# Patient Record
Sex: FEMALE | Employment: OTHER | URBAN - METROPOLITAN AREA
[De-identification: names, ages, dates, MRNs, and addresses within clinical notes are randomized per-mention and may not be internally consistent; named-entity substitution may affect disease eponyms.]

---

## 2021-07-26 ENCOUNTER — EVALUATION (OUTPATIENT)
Dept: PHYSICAL THERAPY | Facility: CLINIC | Age: 66
End: 2021-07-26
Payer: MEDICARE

## 2021-07-26 DIAGNOSIS — M25.552 LATERAL PAIN OF LEFT HIP: Primary | ICD-10-CM

## 2021-07-26 PROCEDURE — 97110 THERAPEUTIC EXERCISES: CPT | Performed by: PHYSICAL THERAPIST

## 2021-07-26 PROCEDURE — 97161 PT EVAL LOW COMPLEX 20 MIN: CPT | Performed by: PHYSICAL THERAPIST

## 2021-07-26 NOTE — LETTER
2021    Diony Morrow MD  C/Tucker Sanders    Patient: China Galvez   YOB: 1955   Date of Visit: 2021     Encounter Diagnosis     ICD-10-CM    1  Lateral pain of left hip  M25 552        Dear Dr Myesha Murrieta:    Thank you for your recent referral of China Galvez  Please review the attached evaluation summary from Iza's recent visit  Please verify that you agree with the plan of care by signing the attached order  If you have any questions or concerns, please do not hesitate to call  I sincerely appreciate the opportunity to share in the care of one of your patients and hope to have another opportunity to work with you in the near future  Sincerely,    Darline Arriaga, PT      Referring Provider:      I certify that I have read the below Plan of Care and certify the need for these services furnished under this plan of treatment while under my care  Diony Morrow MD  [address] [place] [address]  [address]  Via Fax: [phone]          PT Evaluation     Today's date: 2021  Patient name: China Galvez  : 1955  MRN: [id number]  Referring provider: Tate Boyle MD  Dx:   Encounter Diagnosis     ICD-10-CM    1  Lateral pain of left hip  M25 552                   Assessment/Plan  Ms Buffy Luther is a 77 y o  female presenting to outpatient physical therapy with L lateral hip pain, decreased hip ROM, and decreased L hip strength consistent with gluteal tendinopathy  Patient has c/o of L lateral hip pain with after walking and sitting  Provided education to avoid crossing legs and put a pillow between knees while sleeping  Functional limitations are result of the above impairments, which limit her ability to participate in ADLs and recreational activities    No further referral appears necessary at this time based upon examination results    The patients's greatest concerns are the pain not getting better and getting back to recreational activity or sport  Patient was given the opportunity to ask questions, and all questions were answered to the patient's satisfaction  Patient appears motivated, agrees with the POC, and is a good candidate for skilled physical therapy at this time  Patient was provided with handout of HEP consisting of S/L ABD, clamshells, piriformis stretching, and SKTC    Symptom irritability: Low Understanding of Dx/Px/POC: good  Prognosis: good    Goals  STG (4 weeks)  Pain: Patient will subjectively report maximum pain decreased by 2/10  Strength: Patient's will demonstrate L hip ABD strength improved by 1/2 grade  ROM: Patient will increase L hip  ROM by by 25%  Function: Patient increase score on FOTO (initial score: 54) by 10 points    LTG (8 weeks)  Pain: Patient will subjectively report less than 2/10 pain with functional activities  Strength: Patient's will demonstrate L hip ABD strength improved to WNL  ROM: Patient will improve global hip ROM by at least 50%   Function: Patient will increase score on FOTO to predicted value (69) or better  Patient will be ind with HEP by [address] [place] [address] Jim[address] details: Prognosis above is given PT services 2x/week tapering to 1x/week over the next 2 months and home program adherence    Patient would benefit from: skilled physical therapy  Referral necessary: no  Planned modality interventions: Hydrocollator packs, Cryotherapy and TENS  Planned therapy interventions: joint mobilization, manual therapy, massage, neuromuscular re-education, patient education, stretching, strengthening, therapeutic activities, therapeutic exercise, home exercise program, functional ROM exercises, gait training, flexibility, balance, abdominal trunk stabilization, motor coordination training, coordination and behavior modification  Frequency: 2x/week for 8 weeks  Duration in visits: 16  Plan of Care beginning date: 7/26/2021   Plan of Care expiration date: 9/20/2021  Treatment plan discussed with: patient    Subjective  Patient is a 77 y o  female who presents for an initial outpatient physical therapy consultation regarding their L hip pain  Patient reports that their pain began about an year ago  Patient states that she has had two CSI over the last year with minimal relief  Prior to [place], she was in the gym 5 days a week  Has been less active than normal over the last 1 5 year, due to [place] and recent fracture of her R wrist  She states that she has most of her pain after walking  Sitting makes it worse  Has been doing stretches over the last few weeks with some relief  Back surgery 30 years ago  Feels like L leg is slower  Recurrent Problem? yes    Pain  Best: 0/10  Worst: 5/10  Location: Lateral thigh  Quality:  Dull and Sharp  Aggravating factors: After walking, laying on that side, rising from chair, climbing stairs  Alleviating factors: rest, heat  Progression: Improving    Social Support  Lives with:   Home setup: multifloor  Steps into house: 2 steps      Employment status: retired  Hobbies/Recreational Activities: walking, gym     Diagnostic Tests  Xray: Mild arthritis    Treatments:  Previous treatments: medication, stretching  Current treatments: physical therapy      Patient Goals for Therapy  "Loosen up"     Objective     Observations   Left Hip  Negative for atrophy, deformity and edema  Palpation   Left   Tenderness of the gluteus medius  Tenderness     Left Hip   Tenderness in the greater trochanter  Lumbar Screen  Lumbar range of motion within normal limits      Neurological Testing     Sensation     Hip   Left Hip   Intact: light touch    Right Hip   Intact: light touch    Additional Neurological Details  Patient denies neuro symptoms    Passive Range of Motion   Left Hip   Flexion: 100 degrees   Abduction: 20 degrees   External rotation (90/90): 45 degrees with pain  Internal rotation (90/90): 20 degrees     Joint Play   Left Hip   Hypomobile in the posterior hip capsule, anterior hip capsule and lateral hip capsule  Strength/Myotome Testing     Left Hip   Planes of Motion   Flexion: 4  Abduction: 3+  External rotation: 4+  Internal rotation: 4+    Isolated Muscles   Gluteus medius: 3+    Right Hip   Normal muscle strength  Planes of Motion   Flexion: 4+  Abduction: 4  External rotation: 5  Internal rotation: 5    Tests     Left Hip   Positive FADIR  Negative WOODROW and Christine  Modified Davion: Positive         Flowsheet Rows      Most Recent Value   PT/OT G-Codes   Current Score  54   Projected Score  69             Precautions: n/a      Manuals 7/26            L hip PROM             Gluteal STM                                       Neuro Re-Ed                                                                                                        Ther Ex             bike             Piriformis str             SKTC             S/L ABD             Clamshells             bridge             Leg press             MH             Ther Activity                                       Gait Training                                       Modalities                          IE/HEP

## 2021-07-26 NOTE — PROGRESS NOTES
PT Evaluation     Today's date: 2021  Patient name: Dimitri Bowling  : 1955  MRN: [id number]  Referring provider: Paula Gallegos MD  Dx:   Encounter Diagnosis     ICD-10-CM    1  Lateral pain of left hip  M25 552                   Assessment/Plan  Ms Jeanna Lora is a 77 y o  female presenting to outpatient physical therapy with L lateral hip pain, decreased hip ROM, and decreased L hip strength consistent with gluteal tendinopathy  Patient has c/o of L lateral hip pain with after walking and sitting  Provided education to avoid crossing legs and put a pillow between knees while sleeping  Functional limitations are result of the above impairments, which limit her ability to participate in ADLs and recreational activities  No further referral appears necessary at this time based upon examination results    The patients's greatest concerns are the pain not getting better and getting back to recreational activity or sport  Patient was given the opportunity to ask questions, and all questions were answered to the patient's satisfaction  Patient appears motivated, agrees with the POC, and is a good candidate for skilled physical therapy at this time  Patient was provided with handout of HEP consisting of S/L ABD, clamshells, piriformis stretching, and SKTC    Symptom irritability: Low Understanding of Dx/Px/POC: good  Prognosis: good    Goals  STG (4 weeks)  Pain: Patient will subjectively report maximum pain decreased by 2/10  Strength: Patient's will demonstrate L hip ABD strength improved by 1/2 grade  ROM: Patient will increase L hip  ROM by by 25%  Function: Patient increase score on FOTO (initial score: 54) by 10 points    LTG (8 weeks)  Pain: Patient will subjectively report less than 2/10 pain with functional activities    Strength: Patient's will demonstrate L hip ABD strength improved to WNL  ROM: Patient will improve global hip ROM by at least 50%   Function: Patient will increase score on FOTO to predicted value (69) or better  Patient will be ind with HEP by [address] [facility] [address] details: Prognosis above is given PT services 2x/week tapering to 1x/week over the next 2 months and home program adherence  Patient would benefit from: skilled physical therapy  Referral necessary: no  Planned modality interventions: Hydrocollator packs, Cryotherapy and TENS  Planned therapy interventions: joint mobilization, manual therapy, massage, neuromuscular re-education, patient education, stretching, strengthening, therapeutic activities, therapeutic exercise, home exercise program, functional ROM exercises, gait training, flexibility, balance, abdominal trunk stabilization, motor coordination training, coordination and behavior modification  Frequency: 2x/week for 8 weeks  Duration in visits: [address] beginning date: 7/26/2021   Plan of Care expiration date: 9/20/2021  Treatment plan discussed with: patient    Subjective  Patient is a 77 y o  female who presents for an initial outpatient physical therapy consultation regarding their L hip pain  Patient reports that their pain began about an year ago  Patient states that she has had two CSI over the last year with minimal relief  Prior to [place], she was in the gym 5 days a week  Has been less active than normal over the last 1 5 year, due to [place] and recent fracture of her R wrist  She states that she has most of her pain after walking  Sitting makes it worse  Has been doing stretches over the last few weeks with some relief  Back surgery 30 years ago  Feels like L leg is slower  Recurrent Problem?  yes    Pain  Best: 0/10  Worst: 5/10  Location: Lateral thigh  Quality:  Dull and Sharp  Aggravating factors: After walking, laying on that side, rising from chair, climbing stairs  Alleviating factors: rest, heat  Progression: Improving    Social Support  Lives with:   Home setup: multifloor  Steps into house: 2 steps      Employment status: retired  Hobbies/Recreational Activities: walking, gym     Diagnostic Tests  Xray: Mild arthritis    Treatments:  Previous treatments: medication, stretching  Current treatments: physical therapy      Patient Goals for Therapy  "Loosen up"     Objective     Observations   Left Hip  Negative for atrophy, deformity and edema  Palpation   Left   Tenderness of the gluteus medius  Tenderness     Left Hip   Tenderness in the greater trochanter  Lumbar Screen  Lumbar range of motion within normal limits  Neurological Testing     Sensation     Hip   Left Hip   Intact: light touch    Right Hip   Intact: light touch    Additional Neurological Details  Patient denies neuro symptoms    Passive Range of Motion   Left Hip   Flexion: 100 degrees   Abduction: 20 degrees   External rotation (90/90): 45 degrees with pain  Internal rotation (90/90): 20 degrees     Joint Play   Left Hip   Hypomobile in the posterior hip capsule, anterior hip capsule and lateral hip capsule  Strength/Myotome Testing     Left Hip   Planes of Motion   Flexion: 4  Abduction: 3+  External rotation: 4+  Internal rotation: 4+    Isolated Muscles   Gluteus medius: 3+    Right Hip   Normal muscle strength  Planes of Motion   Flexion: 4+  Abduction: 4  External rotation: 5  Internal rotation: 5    Tests     Left Hip   Positive FADIR  Negative WOODROW and Christine  Modified Davion: Positive         Flowsheet Rows      Most Recent Value   PT/OT G-Codes   Current Score  54   Projected Score  69             Precautions: n/a      Manuals 7/26            L hip PROM             Gluteal STM                                       Neuro Re-Ed                                                                                                        Ther Ex             bike             Piriformis str             SKTC             S/L ABD             Clamshells             bridge             Leg press             MH             Ther Activity Gait Training                                       Modalities                          IE/HEP

## 2021-07-30 ENCOUNTER — OFFICE VISIT (OUTPATIENT)
Dept: PHYSICAL THERAPY | Facility: CLINIC | Age: 66
End: 2021-07-30
Payer: MEDICARE

## 2021-07-30 DIAGNOSIS — M25.552 LATERAL PAIN OF LEFT HIP: Primary | ICD-10-CM

## 2021-07-30 PROCEDURE — 97140 MANUAL THERAPY 1/> REGIONS: CPT | Performed by: PHYSICAL THERAPIST

## 2021-07-30 PROCEDURE — 97110 THERAPEUTIC EXERCISES: CPT | Performed by: PHYSICAL THERAPIST

## 2021-07-30 NOTE — PROGRESS NOTES
Daily Note     Today's date: 2021  Patient name: Sneha Joshua  : 1955  MRN: [id number]  Referring provider: Abbott Schaumann, MD  Dx:   Encounter Diagnosis     ICD-10-CM    1  Lateral pain of left hip  M25 552                   Subjective: Patient reports small improvement from initial evaluation  Has been compliant with HEP  Objective: See treatment diary below  Progressed treatment as indicated below  Assessment: Patient was able to tolerate progression of resistance exercises without an increase in pain  They demonstrated muscular fatigue as expected with progression  Patient reported improvement of hip pain following manual therapy  Continue to progress as tolerated  Patient will continue to benefit from skilled PT in order to address impairments and improve function  Plan: Continue per plan of care  Progress treatment as tolerated         Precautions: n/a      Manuals            L hip PROM  JF           Gluteal STM  JF                                     Neuro Re-Ed                                                                                                        Ther Ex             bike  10 min           Piriformis str HEP 5x20"           SKTC HEP 5x20"           S/L ABD  0#  3x10           Clamshells  GTB  3x10           bridge  2x10           Leg press  nv           MH  nv           Mini squats  2x10  To plinthe  YMB            Ther Activity                                       Gait Training                                       Modalities                          IE/HEP

## 2021-08-02 ENCOUNTER — OFFICE VISIT (OUTPATIENT)
Dept: PHYSICAL THERAPY | Facility: CLINIC | Age: 66
End: 2021-08-02
Payer: MEDICARE

## 2021-08-02 DIAGNOSIS — M25.552 LATERAL PAIN OF LEFT HIP: Primary | ICD-10-CM

## 2021-08-02 PROCEDURE — 97140 MANUAL THERAPY 1/> REGIONS: CPT

## 2021-08-02 PROCEDURE — 97110 THERAPEUTIC EXERCISES: CPT

## 2021-08-02 NOTE — PROGRESS NOTES
Daily Note     Today's date: 2021  Patient name: Dimitri Bowling  : 1955  MRN: [id number]  Referring provider: Paula Gallegos MD  Dx:   Encounter Diagnosis     ICD-10-CM    1  Lateral pain of left hip  M25 552                   Subjective: Patient reports having some increased pain and ttp in glute following a 2 mile walk on Saturday and doing her exercises  Objective: See treatment diary below  Assessment: Patient tolerated treatment well  Slight cuing for proper form and  increased glute activation during squats  Pt remains slightly ttp with STM  Additional LE strengthening added today with good tolerance  Pt reports no increased pain following treatment  Pt demonstrates good technique with exercises and fatigue following treatment  Would benefit from continued therapy to address current deficits and maximize function  Plan: Continue per plan of care  Progress treatment as tolerated         Precautions: n/a      Manuals  8/2          L hip PROM  JF SC          Gluteal STM  JF SC                                    Neuro Re-Ed                                                                                                        Ther Ex             bike  10 min 10 min          Piriformis str HEP 5x20" 5"x20          SKTC HEP 5x20" 5"x20          S/L ABD  0#  3x10 0#  3x10          Clamshells  GTB  3x10 GTB  3x10          bridge  2x10 2x10          Leg press  nv 2x10  65#          MH  nv 25#  1x10 ea          Mini squats  2x10  To plinthe  YMB  2x10 to chair w/foam  YMB          Ther Activity                                       Gait Training                                       Modalities                          IE/HEP

## 2021-08-06 ENCOUNTER — OFFICE VISIT (OUTPATIENT)
Dept: PHYSICAL THERAPY | Facility: CLINIC | Age: 66
End: 2021-08-06
Payer: MEDICARE

## 2021-08-06 ENCOUNTER — APPOINTMENT (OUTPATIENT)
Dept: PHYSICAL THERAPY | Facility: CLINIC | Age: 66
End: 2021-08-06
Payer: MEDICARE

## 2021-08-06 DIAGNOSIS — M25.552 LATERAL PAIN OF LEFT HIP: Primary | ICD-10-CM

## 2021-08-06 PROCEDURE — 97110 THERAPEUTIC EXERCISES: CPT | Performed by: PHYSICAL THERAPIST

## 2021-08-06 PROCEDURE — 97140 MANUAL THERAPY 1/> REGIONS: CPT | Performed by: PHYSICAL THERAPIST

## 2021-08-06 NOTE — PROGRESS NOTES
Daily Note     Today's date: 2021  Patient name: Theora Collet  : 1955  MRN: [id number]  Referring provider: Noa Metzger MD  Dx:   Encounter Diagnosis     ICD-10-CM    1  Lateral pain of left hip  M25 552                   Subjective: Patient reports soreness after exercise and after walking  Objective: See treatment diary below  Progressed treatment as indicated below  Assessment: Patient was able to tolerate progression of resistance exercises without an increase in pain  They demonstrated muscular fatigue as expected with progression  Patient demonstrates minor improvements in functional strength and hip ROM  Continue to progress as tolerated  Patient will continue to benefit from skilled PT in order to address impairments and improve function  Plan: Continue per plan of care  Progress treatment as tolerated         Precautions: n/a      Manuals          L hip PROM  JF SC JF         Gluteal STM  JF SC JF                                   Neuro Re-Ed                                                                                                        Ther Ex             bike  10 min 10 min 10 min         Piriformis str HEP 5x20" 5"x20 5x20"         SKTC HEP 5x20" 5"x20 5"x20"         S/L ABD  0#  3x10 0#  3x10 Figure 8  S/L leg press  2x10         Clamshells  GTB  3x10 GTB  3x10 BTB  3x10         bridge  2x10 2x10 2x10         Leg press  nv 2x10  65# 2x10  75#         MH  nv 25#  1x10 ea 25#  2x10         Mini squats  2x10  To plinthe  YMB  2x10 to chair w/foam  YMB 2x10         Ther Activity                                       Gait Training                                       Modalities                          IE/HEP

## 2021-08-17 ENCOUNTER — OFFICE VISIT (OUTPATIENT)
Dept: PHYSICAL THERAPY | Facility: CLINIC | Age: 66
End: 2021-08-17
Payer: MEDICARE

## 2021-08-17 DIAGNOSIS — M25.552 LATERAL PAIN OF LEFT HIP: Primary | ICD-10-CM

## 2021-08-17 PROCEDURE — 97110 THERAPEUTIC EXERCISES: CPT | Performed by: PHYSICAL THERAPIST

## 2021-08-17 PROCEDURE — 97140 MANUAL THERAPY 1/> REGIONS: CPT | Performed by: PHYSICAL THERAPIST

## 2021-08-17 NOTE — PROGRESS NOTES
Daily Note     Today's date: 2021  Patient name: Sneha Joshua  : 1955  MRN: [id number]  Referring provider: Abbott Schaumann, MD  Dx:   Encounter Diagnosis     ICD-10-CM    1  Lateral pain of left hip  M25 552                   Subjective: Patient thinks that she has been able to do more before her pain starts coming on  Continues to have pain with her 1-1 5 mile walks  Objective: See treatment diary below  Assessment neural tension and hip flexor this session  SLR: negative for neural tension  Mod mikel test: (+)    Assessment: Patient demonstrates capsular tightness (L >R)  Patient symptoms improved following inferior and posterior mobilizations  Added a supine hip flexor stretch and and self anterior hip mob for home  Patient will continue to benefit from skilled PT in order to address impairments and improve function  Plan: Continue per plan of care  Progress treatment as tolerated         Precautions: n/a      Manuals         L hip PROM  JF SC JF         Gluteal STM  JF SC JF         Supine hip flexor stretch     JF 30"x2    30"x2 with strap        Neural tension testing     JF        Inf hip mob with belt     JF gr3        Post/lat hip mob with belt     JF  gr3        Neuro Re-Ed                                                                                                        Ther Ex             bike  10 min 10 min 10 min 10 min        Piriformis str HEP 5x20" 5"x20 5x20" 5x20"        SKTC HEP 5x20" 5"x20 5"x20" 5x20"        S/L ABD  0#  3x10 0#  3x10 Figure 8  S/L leg press  2x10 Figure 8 S/Lleg press  Pink  2x10        Clamshells  GTB  3x10 GTB  3x10 BTB  3x10 nv        bridge  2x10 2x10 2x10 nv        Leg press  nv 2x10  65# 2x10  75# nv        MH  nv 25#  1x10 ea 25#  2x10 nv        Mini squats  2x10  To plinthe  YMB  2x10 to chair w/foam  YMB 2x10 nv        Ther Activity                                       Gait Training Modalities                          IE/HEP

## 2021-08-18 ENCOUNTER — APPOINTMENT (OUTPATIENT)
Dept: PHYSICAL THERAPY | Facility: CLINIC | Age: 66
End: 2021-08-18
Payer: MEDICARE

## 2021-08-20 ENCOUNTER — APPOINTMENT (OUTPATIENT)
Dept: PHYSICAL THERAPY | Facility: CLINIC | Age: 66
End: 2021-08-20
Payer: MEDICARE

## 2021-08-20 ENCOUNTER — OFFICE VISIT (OUTPATIENT)
Dept: PHYSICAL THERAPY | Facility: CLINIC | Age: 66
End: 2021-08-20
Payer: MEDICARE

## 2021-08-20 DIAGNOSIS — M25.552 LATERAL PAIN OF LEFT HIP: Primary | ICD-10-CM

## 2021-08-20 PROCEDURE — 97110 THERAPEUTIC EXERCISES: CPT | Performed by: PHYSICAL THERAPIST

## 2021-08-20 PROCEDURE — 97140 MANUAL THERAPY 1/> REGIONS: CPT | Performed by: PHYSICAL THERAPIST

## 2021-08-20 NOTE — PROGRESS NOTES
Daily Note     Today's date: 2021  Patient name: Amy Golden  : 1955  MRN: [id number]  Referring provider: Brendan Ta MD  Dx:   Encounter Diagnosis     ICD-10-CM    1  Lateral pain of left hip  M25 552                   Subjective: Patient reports improvement of groin symptoms following mobilization last session  States that she was a little stiff of her walk last night, but loosened up as the walk progressed    Objective: See treatment diary below  Progress treatment as indicated below  Assessment: Patient was able to tolerate progression of resistance exercises without an increase in pain  They demonstrated muscular fatigue as expected with progression  Patient reported improvement of groin symptoms following manual therapy  Functional strength improving  Continue to progress treatment as tolerated  Patient will continue to benefit from skilled PT in order to address impairments and improve function  Plan: Continue per plan of care  Progress treatment as tolerated         Precautions: n/a      Manuals        L hip PROM  JF SC JF         Gluteal STM  JF SC JF  JF       Supine hip flexor stretch     JF 30"x2    30"x2 with strap        Neural tension testing     JF        Inf hip mob with belt     JF gr3 JF   gr 3       Post/lat hip mob with belt     JF  gr3 JF   gr 3       LAD      JF   gr 3       Neuro Re-Ed                                                                                                        Ther Ex             bike  10 min 10 min 10 min 10 min 10 min       Piriformis str HEP 5x20" 5"x20 5x20" 5x20" 5x20"       SKTC HEP 5x20" 5"x20 5"x20" 5x20" 5x20"       S/L ABD  0#  3x10 0#  3x10 Figure 8  S/L leg press  2x10 Figure 8 S/Lleg press  Pink  2x10 Figure 8 S/Lleg press  Pink  2x10       Clamshells  GTB  3x10 GTB  3x10 BTB  3x10 nv Clams BTB  3x10       bridge  2x10 2x10 2x10 nv BTB  3x10       Leg press  nv 2x10  65# 2x10  75# nv np       MH  nv 25#  1x10 ea 25#  2x10 nv np       Mini squats  2x10  To plinthe  YMB  2x10 to chair w/foam  YMB 2x10 nv  np       Ther Activity                                       Gait Training                                       Modalities                          IE/HEP

## 2021-08-24 ENCOUNTER — OFFICE VISIT (OUTPATIENT)
Dept: PHYSICAL THERAPY | Facility: CLINIC | Age: 66
End: 2021-08-24
Payer: MEDICARE

## 2021-08-24 DIAGNOSIS — M25.552 LATERAL PAIN OF LEFT HIP: Primary | ICD-10-CM

## 2021-08-24 PROCEDURE — 97140 MANUAL THERAPY 1/> REGIONS: CPT | Performed by: PHYSICAL THERAPIST

## 2021-08-24 PROCEDURE — 97110 THERAPEUTIC EXERCISES: CPT | Performed by: PHYSICAL THERAPIST

## 2021-08-24 NOTE — PROGRESS NOTES
Daily Note     Today's date: 2021  Patient name: Tejinder Alvarado  : 1955  MRN: [id number]  Referring provider: Jaye Ga MD  Dx:   Encounter Diagnosis     ICD-10-CM    1  Lateral pain of left hip  M25 552                   Subjective: Patient reports that her hip continues feel better  Reports being able to walk more with less pain  Objective: See treatment diary below  Progressed treatments as tolerated below  Assessment: Patient was able to tolerate progression of resistance exercises without an increase in pain  They demonstrated muscular fatigue as expected with progression  Patient continues to have deficits with hip and glute functional strength  Continue to progress as tolerated  Patient will continue to benefit from skilled PT in order to address impairments and improve function  Plan: Continue per plan of care  Progress treatment as tolerated         Precautions: n/a      Manuals       L hip PROM  JF SC JF         Gluteal STM  JF SC JF  JF       Supine hip flexor stretch     JF 30"x2    30"x2 with strap        Neural tension testing     JF        Inf hip mob with belt     JF gr3 JF   gr 3 JF  Gr 3      Post/lat hip mob with belt     JF  gr3 JF   gr 3 JF   gr3      LAD      JF   gr 3 JF  Gr 3      Neuro Re-Ed                                                                                                        Ther Ex             bike  10 min 10 min 10 min 10 min 10 min 10 min      Piriformis str HEP 5x20" 5"x20 5x20" 5x20" 5x20" 5x20"      SKTC HEP 5x20" 5"x20 5"x20" 5x20" 5x20" 5x20"      S/L ABD  0#  3x10 0#  3x10 Figure 8  S/L leg press  2x10 Figure 8 S/Lleg press  Pink  2x10 Figure 8 S/Lleg press  Pink  2x10 Figure 8  S/L leg press  2x10      Clamshells  GTB  3x10 GTB  3x10 BTB  3x10 nv Clams BTB  3x10 clams BTB  3x10      bridge  2x10 2x10 2x10 nv BTB  3x10 BTB  3x10      Leg press  nv 2x10  65# 2x10  75# nv np       MH  nv 25#  1x10 ea 25#  2x10 nv np       Mini squats  2x10  To plinthe  YMB  2x10 to chair w/foam  YMB 2x10 nv  np       Ther Activity                                       Gait Training                                       Modalities                          IE/HEP

## 2021-08-27 ENCOUNTER — OFFICE VISIT (OUTPATIENT)
Dept: PHYSICAL THERAPY | Facility: CLINIC | Age: 66
End: 2021-08-27
Payer: MEDICARE

## 2021-08-27 DIAGNOSIS — M25.552 LATERAL PAIN OF LEFT HIP: Primary | ICD-10-CM

## 2021-08-27 PROCEDURE — 97110 THERAPEUTIC EXERCISES: CPT | Performed by: PHYSICAL THERAPIST

## 2021-08-27 PROCEDURE — 97140 MANUAL THERAPY 1/> REGIONS: CPT | Performed by: PHYSICAL THERAPIST

## 2021-08-27 NOTE — PROGRESS NOTES
Daily Note     Today's date: 2021  Patient name: Dimitri Bowling  : 1955  MRN: [id number]  Referring provider: Paula Gallegos MD  Dx:   Encounter Diagnosis     ICD-10-CM    1  Lateral pain of left hip  M25 552                   Subjective: Patient reports that her hip is a little sore today  Reports compliance with HEP      Objective: See treatment diary below  Added standing hip abduction exercises  Assessment: Patient tolerated treatment well  Initiated standing exercises for glute strength this session  Patient required cuing for form on monster walks and pball squats  Overall improvement seen with functional hip strength and ROM this session  Continue to progress as tolerated  Patient will continue to benefit from skilled PT in order to address impairments and improve function  Plan: Continue per plan of care  Progress treatment as tolerated         Precautions: n/a      Manuals    L hip PROM  JF SC JF       Gluteal STM  JF SC JF  JF     Supine hip flexor stretch     JF 30"x2    30"x2 with strap      Neural tension testing     JF      Inf hip mob with belt     JF gr3 JF   gr 3 JF  Gr 3 MB   Post/lat hip mob with belt     JF  gr3 JF   gr 3 JF   gr3 Gr 3   LAD      JF   gr 3 JF  Gr 3 Gr 3    Neuro Re-Ed                                                                                        Ther Ex           bike  10 min 10 min 10 min 10 min 10 min 10 min 10min   Piriformis str HEP 5x20" 5"x20 5x20" 5x20" 5x20" 5x20" 5x20"   SKTC HEP 5x20" 5"x20 5"x20" 5x20" 5x20" 5x20" 5x20"   S/L ABD  0#  3x10 0#  3x10 Figure 8  S/L leg press  2x10 Figure 8 S/Lleg press  Pink  2x10 Figure 8 S/Lleg press  Pink  2x10 Figure 8  S/L leg press  2x10 Figure 8 S/L leg press    2x10   Clamshells  GTB  3x10 GTB  3x10 BTB  3x10 nv Clams BTB  3x10 clams BTB  3x10 nv   bridge  2x10 2x10 2x10 nv BTB  3x10 BTB  3x10 nv   Leg press  nv 2x10  65# 2x10  75# nv np     MH nv 25#  1x10 ea 25#  2x10 nv np     Step ups         2x10   pball squats        Split squat  3x5   Monster walk        GTB  4x20ft   Mini squats  2x10  To plinthe  YMB  2x10 to chair w/foam  YMB 2x10 nv  np     Ther Activity                                 Gait Training                                 Modalities                      IE/HEP

## 2021-08-31 ENCOUNTER — OFFICE VISIT (OUTPATIENT)
Dept: PHYSICAL THERAPY | Facility: CLINIC | Age: 66
End: 2021-08-31
Payer: MEDICARE

## 2021-08-31 ENCOUNTER — APPOINTMENT (OUTPATIENT)
Dept: PHYSICAL THERAPY | Facility: CLINIC | Age: 66
End: 2021-08-31
Payer: MEDICARE

## 2021-08-31 DIAGNOSIS — M25.552 LATERAL PAIN OF LEFT HIP: Primary | ICD-10-CM

## 2021-08-31 PROCEDURE — 97110 THERAPEUTIC EXERCISES: CPT | Performed by: PHYSICAL THERAPIST

## 2021-08-31 PROCEDURE — 97140 MANUAL THERAPY 1/> REGIONS: CPT | Performed by: PHYSICAL THERAPIST

## 2021-08-31 NOTE — PROGRESS NOTES
Daily Note     Today's date: 2021  Patient name: Tayler Jay  : 1955  MRN: [id number]  Referring provider: Neila Dakins, MD  Dx:   Encounter Diagnosis     ICD-10-CM    1  Lateral pain of left hip  M25 552                   Subjective: Pt states that she was "very sore" after her last session  Pt reports she had difficulty performing her stretches over the weekend due to her soreness  Pt states her soreness has decreased over the weekend but still feels a little sore today  Objective: See treatment diary below  Held progressions due to patient soreness  Assessment: Tolerated treatment well  During assessment of IR, pt muscle guarding makes assessment difficult  Pt muscle guarding improved with LAD  Step ups introduced to improve functional hip stabilization and quad strength  Pt required cueing on step ups to load weight onto her L leg rather than pushing up with R leg  Pt was able to correct after cueing  Pt required cueing to lead with her knees on monster walks  Pt was able to correct on second lap after numerous cues  Green TB was removed for monster walks this session due to significant increase in soreness following previous session  Pt continues to show weakness in glute med strength, coordination & endurance, and limited hip ROM  Pt is showing small improvements in general coordination of LE muscle activation with exercise  Patient would benefit from continued PT to decrease deficits and improve function  Plan: Continue per plan of care  Progress treatment as tolerated  Plan to begin with hip LAD prior to lateral glides of hip        Precautions: n/a      Manuals    L hip PROM  JF SC JF     JF   Gluteal STM  JF SC JF  JF      Supine hip flexor stretch     JF 30"x2    30"x2 with strap       Neural tension testing     JF       Inf hip mob with belt     JF gr3 JF   gr 3 JF  Gr 3 MB MB  Gr 3   Post/lat hip mob with belt JF  gr3 JF   gr 3 JF   gr3 MB  Gr 3 MB  Gr 3   LAD      JF   gr 3 JF  Gr 3 MB  Gr 3  MB  Gr 3   Neuro Re-Ed                                                                                                Ther Ex            bike  10 min 10 min 10 min 10 min 10 min 10 min 10min 10 min   Piriformis str HEP 5x20" 5"x20 5x20" 5x20" 5x20" 5x20" 5x20"    SKTC HEP 5x20" 5"x20 5"x20" 5x20" 5x20" 5x20" 5x20"    S/L ABD  0#  3x10 0#  3x10 Figure 8  S/L leg press  2x10 Figure 8 S/Lleg press  Pink  2x10 Figure 8 S/Lleg press  Pink  2x10 Figure 8  S/L leg press  2x10 Figure 8 S/L leg press    2x10    Clamshells  GTB  3x10 GTB  3x10 BTB  3x10 nv Clams BTB  3x10 clams BTB  3x10 nv BTB 3x10   bridge  2x10 2x10 2x10 nv BTB  3x10 BTB  3x10 nv BTB  3x10   Leg press  nv 2x10  65# 2x10  75# nv np      MH  nv 25#  1x10 ea 25#  2x10 nv np      Step ups         nv  2x10   pball squats        Split squat  3x5  Split squat  3x10   Monster walk        GTB  4x20ft No TB  4x20ft   Mini squats  2x10  To plinthe  YMB  2x10 to chair w/foam  YMB 2x10 nv  np      Ther Activity                                    Gait Training                                    Modalities                        IE/HEP

## 2021-09-02 ENCOUNTER — APPOINTMENT (OUTPATIENT)
Dept: PHYSICAL THERAPY | Facility: CLINIC | Age: 66
End: 2021-09-02
Payer: MEDICARE

## 2021-09-03 ENCOUNTER — OFFICE VISIT (OUTPATIENT)
Dept: PHYSICAL THERAPY | Facility: CLINIC | Age: 66
End: 2021-09-03
Payer: MEDICARE

## 2021-09-03 DIAGNOSIS — M25.552 LATERAL PAIN OF LEFT HIP: Primary | ICD-10-CM

## 2021-09-03 PROCEDURE — 97110 THERAPEUTIC EXERCISES: CPT | Performed by: PHYSICAL THERAPIST

## 2021-09-03 PROCEDURE — 97140 MANUAL THERAPY 1/> REGIONS: CPT | Performed by: PHYSICAL THERAPIST

## 2021-09-03 NOTE — PROGRESS NOTES
PT Re-evaluation     Today's date: 9/3/2021   Patient name: Davion Crow  : 1955  MRN: [id number]  Referring provider: Adalberto Mcclellan MD  Dx:   Encounter Diagnosis     ICD-10-CM    1  Lateral pain of left hip  M25 552        Assessment/Plan  Ms Bryant Bumpers is a 77 y o  female presenting to outpatient physical therapy with L lateral hip pain  Patient has attended 10 visits over 5 weeks  Patient has made the following improvements:  - Decreased max pain to 4/10, previously was 5/10, and reports feeling this pain less frequently  - Increased L hip ROM in all planes  See objective section for measurements  - Increased L hip strength in abduction by at least 1/2 a grade  Patient progressing well  Patient continues to show deficits in decreased L hip ROM in all planes, as well as decreased L hip strength  Patient's program as included global hip strengthening, patient self stretching and manual therapy for hip ROM  Program will be progressing to add functional hip strengthening and balance  See updated goals below  Goals  STG (4 weeks)  Pain: Patient will subjectively report maximum pain decreased by 2/10 - Progressing   Strength: Patient's will demonstrate L hip ABD strength improved by 1/2 grade - MET  ROM: Patient will increase L hip  ROM by by 25% - Progressing  Function: Patient increase score on FOTO (initial score: 54) by 10 points - Not MET    LTG (8 weeks)  Pain: Patient will subjectively report less than 2/10 pain with functional activities  - progressing  Strength: Patient's will demonstrate L hip ABD strength improved to WNL - progressing  ROM: Patient will improve global hip ROM by at least 50% - progressing  Function: Patient will increase score on FOTO to predicted value (69) or better  Patient will be ind with HEP by [address] [place] [address] Jimmie Conley [address] details: Prognosis above is given PT services 2x/week tapering to 1x/week over the next 4 weeks and home program adherence    Patient would benefit from: skilled physical therapy  Referral necessary: no  Planned modality interventions: Hydrocollator packs, Cryotherapy and TENS  Planned therapy interventions: joint mobilization, manual therapy, massage, neuromuscular re-education, patient education, stretching, strengthening, therapeutic activities, therapeutic exercise, home exercise program, functional ROM exercises, gait training, flexibility, balance, abdominal trunk stabilization, motor coordination training, coordination and behavior modification  Frequency: 2x/week for 4 weeks  Duration in visits: 8  Plan of Care beginning date: 9/03/2021  Plan of Care expiration date: 10/01/2021  Treatment plan discussed with: patient    Subjective  Update (9/3/2021) Pt states her hip is feeling slightly "looser"  Pt states walking is a little easier, but has not been walking as much due to weather  Pt reports that she has not having the same pain that she had originally, but reports pain is now felt on the outside of her hip  Pt reports she still has pain with going up stairs, but does not notice the pain as much  Pt states she still has pain getting up from a chair, but it is not as bad as before  Pt does not report any pain laying on her left side anymore  IE:   Patient is a 77 y o  female who presents for an initial outpatient physical therapy consultation regarding their L hip pain  Patient reports that their pain began about an year ago  Patient states that she has had two CSI over the last year with minimal relief  Prior to [place], she was in the gym 5 days a week  Has been less active than normal over the last 1 5 year, due to [place] and recent fracture of her R wrist  She states that she has most of her pain after walking  Sitting makes it worse  Has been doing stretches over the last few weeks with some relief  Back surgery 30 years ago  Feels like L leg is slower  Recurrent Problem?  yes    Pain  Best: 0/10  Worst: 4/10  (was 5/10)  Location: Lateral thigh  Quality: Achy(Dull and Sharp  Aggravating factors: Rising from chair, climbing stairs (was after walking, laying on that side, rising from chair, climbing stairs)  Alleviating factors: rest, heat  Progression: Improving    Social Support  Lives with:   Home setup: multifloor  Steps into house: 2 steps      Employment status: retired  Hobbies/Recreational Activities: walking, gym     Diagnostic Tests  Xray: Mild arthritis    Treatments:  Previous treatments: medication, stretching  Current treatments: physical therapy      Patient Goals for Therapy  "Loosen up" - 50% met  New patient goal: "Get stronger"    Objective     Observations   Left Hip  Negative for atrophy, deformity and edema  Palpation   Left   Not TTP over greater troch, glute med or piriformis (was Tenderness of the gluteus medius )      Passive Range of Motion   Left Hip   Flexion: 105 degrees (was 100 degrees)  Abduction: 20 degrees   External rotation (90/90): 50 degrees (was 45 degrees with pain  Internal rotation (90/90): 27 degrees (was 20 degrees )     Joint Play   Left Hip   Hypomobile in the posterior hip capsule, anterior hip capsule and lateral hip capsule, but improving  Strength/Myotome Testing     Left Hip   Planes of Motion   Flexion: 4 (was 4)  Abduction: 4- (was 3+)   External rotation: 4+  Internal rotation: 4+    Isolated Muscles   Gluteus medius: 4- (was 3+)    Right Hip   Normal muscle strength  Planes of Motion   Flexion: 4+  Abduction: 4  External rotation: 5  Internal rotation: 5    Tests     Left Hip   Positive FADIR and WOODROW, pain in anterior hip  Negative WOODROW and Christine  Modified Davion: Positive         Flowsheet Rows      Most Recent Value   PT/OT G-Codes   Current Score  54   Projected Score  69             Precautions: n/a       Manuals 7/26 07/30 8/2 8/6 8/17 8/20 8/24 8/27 8/31 9/3   L hip PROM   JF SC JF         JF JF   Gluteal STM   JF SC JF   JF          Supine hip flexor stretch         JF 30"x2     30"x2 with strap            Neural tension testing         JF            Inf hip mob with belt         JF gr3 JF   gr 3 JF  Gr 3 MB MB  Gr 3 Held for re-eval   Post/lat hip mob with belt         JF  gr3 JF   gr 3 JF   gr3 MB  Gr 3 MB  Gr 3    LAD           JF   gr 3 JF  Gr 3 MB  Gr 3  MB  Gr 3    Neuro Re-Ed                                                                                                                                                                                       Ther Ex                      bike   10 min 10 min 10 min 10 min 10 min 10 min 10min 10 min    Piriformis str HEP 5x20" 5"x20 5x20" 5x20" 5x20" 5x20" 5x20"      SKTC HEP 5x20" 5"x20 5"x20" 5x20" 5x20" 5x20" 5x20"      S/L ABD   0#  3x10 0#  3x10 Figure 8  S/L leg press  2x10 Figure 8 S/Lleg press  Pink  2x10 Figure 8 S/Lleg press  Pink  2x10 Figure 8  S/L leg press  2x10 Figure 8 S/L leg press     2x10      Clamshells   GTB  3x10 GTB  3x10 BTB  3x10 nv Clams BTB  3x10 clams BTB  3x10 nv BTB 3x10    bridge   2x10 2x10 2x10 nv BTB  3x10 BTB  3x10 nv BTB  3x10    Leg press   nv 2x10  65# 2x10  75# nv np          MH   nv 25#  1x10 ea 25#  2x10 nv np          Step ups                nv  2x10    pball squats               Split squat  3x5  Split squat  3x10    Monster walk               GTB  4x20ft No TB  4x20ft    Mini squats   2x10  To plinthe  YMB  2x10 to chair w/foam  YMB 2x10 nv  np          Ther Activity                                                                    Gait Training                                                                    Modalities                                             IE/HEP                   Re-eval  JF

## 2021-09-13 ENCOUNTER — OFFICE VISIT (OUTPATIENT)
Dept: PHYSICAL THERAPY | Facility: CLINIC | Age: 66
End: 2021-09-13
Payer: MEDICARE

## 2021-09-13 DIAGNOSIS — M25.552 LATERAL PAIN OF LEFT HIP: Primary | ICD-10-CM

## 2021-09-13 PROCEDURE — 97110 THERAPEUTIC EXERCISES: CPT | Performed by: PHYSICAL THERAPIST

## 2021-09-13 PROCEDURE — 97140 MANUAL THERAPY 1/> REGIONS: CPT | Performed by: PHYSICAL THERAPIST

## 2021-09-13 NOTE — PROGRESS NOTES
Daily Note     Today's date: 2021  Patient name: Padilla Mosley  : 1955  MRN: [id number]  Referring provider: Aren Arriaga MD  Dx:   Encounter Diagnosis     ICD-10-CM    1  Lateral pain of left hip  M25 552                   Subjective: Patient reports her hip is feeling a little "sore" today  Patient states she walked on soft sand while on vacation and was sore afterwards  Patient reports a a mild flare up of her plantar fasciitis but is resolving with wearing her orthotics  Objective: See treatment diary below  Introduced [employer] med push with PB and walking marches      Assessment: Tolerated treatment well  Patient demonstrated decreased left hip ROM due to decreased stretching while on vacation  Introduced glute med push to increase glute med strength and endurance  Introduced walking marches to improve dynamic balance, hip flexor strength and continue to work glute med strength and endurance  Pt required cueing during monster walks to lead with her knee and ER her hip  Pt was able to correct with cueing  Patient required supervision while performing walking marches for patient safety  Pt continues to show deficits in left hip ROM and strength  Patient reports increased muscular fatigue following treatment session  Patient would benefit from continued PT to address deficits and improve function  Plan: Continue per plan of care  Progress treatment as tolerated         Precautions: n/a      Manuals    L hip PROM  JF SC JF     JF MB   Gluteal STM  JF SC JF  JF    MB   Supine hip flexor stretch     JF 30"x2    30"x2 with strap     MB   Neural tension testing     JF        Inf hip mob with belt     JF gr3 JF   gr 3 JF  Gr 3 MB MB  Gr 3    Post/lat hip mob with belt     JF  gr3 JF   gr 3 JF   gr3 MB  Gr 3 MB  Gr 3    LAD      JF   gr 3 JF  Gr 3 MB  Gr 3  MB  Gr 3    Neuro Re-Ed Ther Ex             bike  10 min 10 min 10 min 10 min 10 min 10 min 10min 10 min 10 min   Piriformis str HEP 5x20" 5"x20 5x20" 5x20" 5x20" 5x20" 5x20"     SKTC HEP 5x20" 5"x20 5"x20" 5x20" 5x20" 5x20" 5x20"     S/L ABD  0#  3x10 0#  3x10 Figure 8  S/L leg press  2x10 Figure 8 S/Lleg press  Pink  2x10 Figure 8 S/Lleg press  Pink  2x10 Figure 8  S/L leg press  2x10 Figure 8 S/L leg press    2x10  Figure 8 S/L Leg press  PinkTB    2x15   Clamshells  GTB  3x10 GTB  3x10 BTB  3x10 nv Clams BTB  3x10 clams BTB  3x10 nv BTB 3x10 BTB  2x15   bridge  2x10 2x10 2x10 nv BTB  3x10 BTB  3x10 nv BTB  3x10 BTB  2x15   Leg press  nv 2x10  65# 2x10  75# nv np       MH  nv 25#  1x10 ea 25#  2x10 nv np       Step ups         nv  2x10    pball squats        Split squat  3x5  Split squat  3x10    Monster walk        GTB  4x20ft No TB  4x20ft No TB  2x20ft   Mini squats  2x10  To plinthe  YMB  2x10 to chair w/foam  YMB 2x10 nv  np       Marches           No TB  2x20ft   Glute Med Push           2x10  5"    Ther Activity                                       Gait Training                                       Modalities                          IE/HEP

## 2021-09-16 ENCOUNTER — APPOINTMENT (OUTPATIENT)
Dept: PHYSICAL THERAPY | Facility: CLINIC | Age: 66
End: 2021-09-16
Payer: MEDICARE

## 2021-09-17 ENCOUNTER — APPOINTMENT (OUTPATIENT)
Dept: PHYSICAL THERAPY | Facility: CLINIC | Age: 66
End: 2021-09-17
Payer: MEDICARE

## 2021-09-20 ENCOUNTER — OFFICE VISIT (OUTPATIENT)
Dept: PHYSICAL THERAPY | Facility: CLINIC | Age: 66
End: 2021-09-20
Payer: MEDICARE

## 2021-09-20 DIAGNOSIS — M25.552 LATERAL PAIN OF LEFT HIP: Primary | ICD-10-CM

## 2021-09-20 PROCEDURE — 97140 MANUAL THERAPY 1/> REGIONS: CPT | Performed by: PHYSICAL THERAPIST

## 2021-09-20 PROCEDURE — 97110 THERAPEUTIC EXERCISES: CPT | Performed by: PHYSICAL THERAPIST

## 2021-09-20 NOTE — PROGRESS NOTES
Daily Note     Today's date: 2021  Patient name: Padilla Mosley  : 1955  MRN: [id number]  Referring provider: Aren Arriaga MD  Dx:   Encounter Diagnosis     ICD-10-CM    1  Lateral pain of left hip  M25 552                   Subjective: Patient states her leg was feeling "okay" yesterday  Patient reports she had a stye over the weekend and a rash from a bug bite  Patient reports she received a massage over the weekend  Patient states she felt loose after the massage, but quickly felt her muscles tightening  Objective: See treatment diary below      Assessment: Tolerated treatment well  Modified dynamic marches to exaggerated marches to allow for a more controlled motion and increase LE strength  Patient was educated on the use of TENS machines for pain relief  Patient was also educated on the importance of stretching to increase muscle extensibility  Added step up's and monster walks to patient HEP  Patient demonstrated increased ROM since past session  Patient continues to demonstrate decreased global left hip ROM and left hip strength  Patient would benefit from continued PT to address deficits and improve function  Plan: Continue per plan of care  Progress treatment as tolerated         Precautions: n/a      Manuals    L hip PROM  JF SC JF     JF MB MB   Gluteal STM  JF SC JF  JF    MB MB   Supine hip flexor stretch     JF 30"x2    30"x2 with strap     MB MB   Neural tension testing     JF         Inf hip mob with belt     JF gr3 JF   gr 3 JF  Gr 3 MB MB  Gr 3  MB  Gr3   Post/lat hip mob with belt     JF  gr3 JF   gr 3 JF   gr3 MB  Gr 3 MB  Gr 3  MB  Gr 3   LAD      JF   gr 3 JF  Gr 3 MB  Gr 3  MB  Gr 3  MB  Gr 4   Neuro Re-Ed                                                                                                                Ther Ex              bike  10 min 10 min 10 min 10 min 10 min 10 min 10min 10 min 10 min 10 min   Piriformis str HEP 5x20" 5"x20 5x20" 5x20" 5x20" 5x20" 5x20"      SKTC HEP 5x20" 5"x20 5"x20" 5x20" 5x20" 5x20" 5x20"      S/L ABD  0#  3x10 0#  3x10 Figure 8  S/L leg press  2x10 Figure 8 S/Lleg press  Pink  2x10 Figure 8 S/Lleg press  Pink  2x10 Figure 8  S/L leg press  2x10 Figure 8 S/L leg press    2x10  Figure 8 S/L Leg press  PinkTB    2x15 Figure 8  S/L Leg press  GTB  2x10   Clamshells  GTB  3x10 GTB  3x10 BTB  3x10 nv Clams BTB  3x10 clams BTB  3x10 nv BTB 3x10 BTB  2x15 BTB  2x15   bridge  2x10 2x10 2x10 nv BTB  3x10 BTB  3x10 nv BTB  3x10 BTB  2x15 BTB  2x15   Leg press  nv 2x10  65# 2x10  75# nv np        MH  nv 25#  1x10 ea 25#  2x10 nv np        Step ups         nv  2x10  HEP   pball squats        Split squat  3x5  Split squat  3x10     Monster walk        GTB  4x20ft No TB  4x20ft No TB  2x20ft HEP   Mini squats  2x10  To plinthe  YMB  2x10 to chair w/foam  YMB 2x10 nv  np        Marches           No TB  2x20ft Exaggerated marches  2x15 ea   Glute Med Push           2x10  5"     Ther Activity                                          Gait Training                                          Modalities                            IE/HEP

## 2021-09-23 ENCOUNTER — OFFICE VISIT (OUTPATIENT)
Dept: PHYSICAL THERAPY | Facility: CLINIC | Age: 66
End: 2021-09-23
Payer: MEDICARE

## 2021-09-23 DIAGNOSIS — M25.552 LATERAL PAIN OF LEFT HIP: Primary | ICD-10-CM

## 2021-09-23 PROCEDURE — 97110 THERAPEUTIC EXERCISES: CPT | Performed by: PHYSICAL THERAPIST

## 2021-09-23 PROCEDURE — 97140 MANUAL THERAPY 1/> REGIONS: CPT | Performed by: PHYSICAL THERAPIST

## 2021-09-23 NOTE — PROGRESS NOTES
Daily Note     Today's date: 2021  Patient name: Chacorta Cummings  : 1955  MRN: [id number]  Referring provider: Germain Washington MD  Dx:   Encounter Diagnosis     ICD-10-CM    1  Lateral pain of left hip  M25 552                   Subjective: Patient reports that her hip is feeling a little bit better today  Hip feels stiff, but not affecting her sleep  Patient reports that she wants to get back to aerobics classes  "That's how I'll know my hip is better"  She has plans for returning in the coming week  Objective: See treatment diary below  Progressed treatment as indicated below  FOTO: 72      Assessment: Patient was able to tolerate progression of resistance exercises without an increase in pain  They demonstrated muscular fatigue as expected with progression  Patient demonstrates improved exercised tolerance this session  Added MWM to improve hip flexion, and patient demonstrated improved symptoms  Progressed clamshells to further address hip ABD weakness  Trialed TENS at patient's request, and it was tolerated well  Continue to progress as tolerated  Patient will continue to benefit from skilled PT in order to address impairments and improve function  Plan: Continue per plan of care  Progress treatment as tolerated         Precautions: n/a      Manuals    L hip PROM  JF MB MB MB   Gluteal STM   MB MB    Supine hip flexor stretch   MB MB    Neural tension testing        Inf hip mob with belt MB MB  Gr 3  MB  Gr3    Post/lat hip mob with belt MB  Gr 3 MB  Gr 3  MB  Gr 3 MB  Gr 3   LAD MB  Gr 3  MB  Gr 3  MB  Gr 4    Hip flexion MWM     MB   Neuro Re-Ed                                                                Ther Ex        bike 10min 10 min 10 min 10 min 10 min   Piriformis str 5x20"       SKTC 5x20"       S/L ABD Figure 8 S/L leg press    2x10  Figure 8 S/L Leg press  PinkTB    2x15 Figure 8  S/L Leg press  GTB  2x10    Clamshells nv BTB 3x10 BTB  2x15 BTB  2x15 Clam #2  2x15   bridge nv BTB  3x10 BTB  2x15 BTB  2x15    Leg press        MH        Step ups  nv  2x10  HEP    pball squats Split squat  3x5  Split squat  3x10      Monster walk GTB  4x20ft No TB  4x20ft No TB  2x20ft HEP    Mini squats        Marches    No TB  2x20ft Exaggerated marches  2x15 ea    Glute Med Push    2x10  5"   2x12 exaggered marches  2x15   Ther Activity                        Gait Training                        Modalities                IE/HEP

## 2021-09-24 ENCOUNTER — APPOINTMENT (OUTPATIENT)
Dept: PHYSICAL THERAPY | Facility: CLINIC | Age: 66
End: 2021-09-24
Payer: MEDICARE

## 2021-09-28 ENCOUNTER — OFFICE VISIT (OUTPATIENT)
Dept: PHYSICAL THERAPY | Facility: CLINIC | Age: 66
End: 2021-09-28
Payer: MEDICARE

## 2021-09-28 DIAGNOSIS — M25.552 LATERAL PAIN OF LEFT HIP: Primary | ICD-10-CM

## 2021-09-28 PROCEDURE — 97140 MANUAL THERAPY 1/> REGIONS: CPT | Performed by: PHYSICAL THERAPIST

## 2021-09-28 PROCEDURE — 97110 THERAPEUTIC EXERCISES: CPT | Performed by: PHYSICAL THERAPIST

## 2021-09-28 NOTE — PROGRESS NOTES
Daily Note     Today's date: 2021  Patient name: Torin Pereira  : 1955  MRN: [id number]  Referring provider: Sofi Carcamo MD  Dx:   Encounter Diagnosis     ICD-10-CM    1  Lateral pain of left hip  M25 [id number]                   Subjective: Patient reports that her her hip was sore over the weekend      Objective: See treatment diary below  Added U/L RDL this session      Assessment: Provided continued education regarding muscular sources of her pain  Patient was able to tolerate introduction of U/L RDLs this session  Patient progressing slowly  Continue to progress as tolerated  Patient will continue to benefit from skilled PT in order to address impairments and improve function  Plan: Continue per plan of care  Progress treatment as tolerated         Precautions: n/a      Manuals    L hip PROM  JF MB MB MB MB   Gluteal STM   MB MB     Supine hip flexor stretch   MB MB     Neural tension testing         Inf hip mob with belt MB MB  Gr 3  MB  Gr3  JF  Gr 3   Post/lat hip mob with belt MB  Gr 3 MB  Gr 3  MB  Gr 3 MB  Gr 3 JF  Gr 3   LAD MB  Gr 3  MB  Gr 3  MB  Gr 4     Hip flexion MWM     MB JF   Neuro Re-Ed                                                                        Ther Ex         bike 10min 10 min 10 min 10 min 10 min 10 min   Piriformis str 5x20"        SKTC 5x20"        S/L ABD Figure 8 S/L leg press    2x10  Figure 8 S/L Leg press  PinkTB    2x15 Figure 8  S/L Leg press  GTB  2x10     Clamshells nv BTB 3x10 BTB  2x15 BTB  2x15 Clam 2    2x15 Clam 2  2x15   bridge nv BTB  3x10 BTB  2x15 BTB  2x15  nv   Leg press         MH         Step ups  nv  2x10  HEP     pball squats Split squat  3x5  Split squat  3x10       Monster walk GTB  4x20ft No TB  4x20ft No TB  2x20ft HEP     Mini squats         Marches    No TB  2x20ft Exaggerated marches  2x15 ea 2x12 exaggered marches  2x15 2x12   Glute Med Push    2x10  5"    2x10  5"   U/L RDL      W/ Kick stand  2x10   Ther Activity                           Gait Training                           Modalities                  IE/HEP

## 2021-09-30 ENCOUNTER — APPOINTMENT (OUTPATIENT)
Dept: PHYSICAL THERAPY | Facility: CLINIC | Age: 66
End: 2021-09-30
Payer: MEDICARE

## 2021-10-01 ENCOUNTER — OFFICE VISIT (OUTPATIENT)
Dept: PHYSICAL THERAPY | Facility: CLINIC | Age: 66
End: 2021-10-01
Payer: MEDICARE

## 2021-10-01 ENCOUNTER — APPOINTMENT (OUTPATIENT)
Dept: PHYSICAL THERAPY | Facility: CLINIC | Age: 66
End: 2021-10-01
Payer: MEDICARE

## 2021-10-01 DIAGNOSIS — M25.552 LATERAL PAIN OF LEFT HIP: Primary | ICD-10-CM

## 2021-10-01 PROCEDURE — 97140 MANUAL THERAPY 1/> REGIONS: CPT | Performed by: PHYSICAL THERAPIST

## 2021-10-01 PROCEDURE — 97110 THERAPEUTIC EXERCISES: CPT | Performed by: PHYSICAL THERAPIST

## 2021-10-04 ENCOUNTER — OFFICE VISIT (OUTPATIENT)
Dept: PHYSICAL THERAPY | Facility: CLINIC | Age: 66
End: 2021-10-04
Payer: MEDICARE

## 2021-10-04 DIAGNOSIS — M25.552 LATERAL PAIN OF LEFT HIP: Primary | ICD-10-CM

## 2021-10-04 PROCEDURE — 97110 THERAPEUTIC EXERCISES: CPT | Performed by: PHYSICAL THERAPIST

## 2021-10-08 ENCOUNTER — EVALUATION (OUTPATIENT)
Dept: PHYSICAL THERAPY | Facility: CLINIC | Age: 66
End: 2021-10-08
Payer: MEDICARE

## 2021-10-08 DIAGNOSIS — M25.552 LATERAL PAIN OF LEFT HIP: Primary | ICD-10-CM

## 2021-10-08 PROCEDURE — 97140 MANUAL THERAPY 1/> REGIONS: CPT | Performed by: PHYSICAL THERAPIST

## 2021-10-08 PROCEDURE — 97110 THERAPEUTIC EXERCISES: CPT | Performed by: PHYSICAL THERAPIST

## 2021-10-11 ENCOUNTER — APPOINTMENT (OUTPATIENT)
Dept: PHYSICAL THERAPY | Facility: CLINIC | Age: 66
End: 2021-10-11
Payer: MEDICARE

## 2021-10-19 ENCOUNTER — APPOINTMENT (OUTPATIENT)
Dept: PHYSICAL THERAPY | Facility: CLINIC | Age: 66
End: 2021-10-19
Payer: MEDICARE

## 2021-10-22 ENCOUNTER — APPOINTMENT (OUTPATIENT)
Dept: PHYSICAL THERAPY | Facility: CLINIC | Age: 66
End: 2021-10-22
Payer: MEDICARE

## 2021-10-26 ENCOUNTER — APPOINTMENT (OUTPATIENT)
Dept: PHYSICAL THERAPY | Facility: CLINIC | Age: 66
End: 2021-10-26
Payer: MEDICARE

## 2021-10-28 ENCOUNTER — OFFICE VISIT (OUTPATIENT)
Dept: PHYSICAL THERAPY | Facility: CLINIC | Age: 66
End: 2021-10-28
Payer: MEDICARE

## 2021-10-28 DIAGNOSIS — M25.552 LATERAL PAIN OF LEFT HIP: Primary | ICD-10-CM

## 2021-10-28 PROCEDURE — 97110 THERAPEUTIC EXERCISES: CPT | Performed by: PHYSICAL THERAPIST
